# Patient Record
Sex: MALE | Race: WHITE | NOT HISPANIC OR LATINO | Employment: FULL TIME | URBAN - NONMETROPOLITAN AREA
[De-identification: names, ages, dates, MRNs, and addresses within clinical notes are randomized per-mention and may not be internally consistent; named-entity substitution may affect disease eponyms.]

---

## 2025-08-07 ENCOUNTER — OFFICE VISIT (OUTPATIENT)
Dept: URGENT CARE | Facility: CLINIC | Age: 60
End: 2025-08-07
Payer: COMMERCIAL

## 2025-08-07 VITALS
WEIGHT: 225 LBS | SYSTOLIC BLOOD PRESSURE: 150 MMHG | RESPIRATION RATE: 16 BRPM | DIASTOLIC BLOOD PRESSURE: 81 MMHG | HEIGHT: 68 IN | BODY MASS INDEX: 34.1 KG/M2 | TEMPERATURE: 97.8 F | OXYGEN SATURATION: 98 % | HEART RATE: 57 BPM

## 2025-08-07 DIAGNOSIS — R05.1 ACUTE COUGH: ICD-10-CM

## 2025-08-07 DIAGNOSIS — J01.90 ACUTE SINUSITIS, RECURRENCE NOT SPECIFIED, UNSPECIFIED LOCATION: Primary | ICD-10-CM

## 2025-08-07 PROCEDURE — 99213 OFFICE O/P EST LOW 20 MIN: CPT | Performed by: NURSE PRACTITIONER

## 2025-08-07 RX ORDER — AMOXICILLIN AND CLAVULANATE POTASSIUM 875; 125 MG/1; MG/1
1 TABLET, FILM COATED ORAL 2 TIMES DAILY
Qty: 20 TABLET | Refills: 0 | Status: SHIPPED | OUTPATIENT
Start: 2025-08-07 | End: 2025-08-17

## 2025-08-07 RX ORDER — BENZONATATE 100 MG/1
100-200 CAPSULE ORAL EVERY 8 HOURS PRN
Qty: 60 CAPSULE | Refills: 0 | Status: SHIPPED | OUTPATIENT
Start: 2025-08-07

## 2025-08-07 NOTE — PROGRESS NOTES
[facility] U[facility]  Eileen ERICKA Mares, APRN-CNP     Visit Note - 8/7/2025 5:07 PM   This note was generated with voice recognition software and may contain errors including spelling, grammar, syntax, and misrecognization of what was dictated.    Patient: Yovani Vasquez, MRN: [id number], 59 y.o., male   PCP: Reilly Wasserman MD  ------------------------------------  ALLERGIES: Allergies[1]     CURRENT MEDICATIONS:   Current Outpatient Medications   Medication Instructions    amoxicillin-clavulanate (Augmentin) 875-125 mg tablet 1 tablet, oral, 2 times daily, Take with a meal.    benzonatate (TESSALON) 100-200 mg, oral, Every 8 hours PRN, Do not crush or chew.     ------------------------------------  PAST MEDICAL HX:  Problem List[2]   SURGICAL HX:  Surgical History[3]   FAMILY HX:   No pertinent history.   SOCIAL HX:    reports that he has been smoking cigarettes. He does not have any smokeless tobacco history on file. . Employed.   ------------------------------------  CHIEF COMPLAINT:   Chief Complaint   Patient presents with    URI     SINUS DRAINAGE, COUGH, FACE PRESSURE  X 10 DAYS       HISTORY OF PRESENT ILLNESS: The history was obtained from patient and his wife. Yovani is a 59 y.o. male, who presents with a chief complaint of sinus pain/nasal congestion (clear mucus) that has been radiating into his teeth, a persistent, dry cough, scratchy throat, and sinus headaches - sxs started 1.5 weeks ago. Denies any fever/chills, body aches, ear pain, abdominal pain, chest pain, wheezing/shortness of breath, rashes, urinary symptoms, nausea/vomiting, and diarrhea. Denies any lightheadedness or dizziness; no changes in mental status. No swelling in legs. Appetite is normal; is able to eat and drink fluids without difficulty; denies loss of sense of taste or smell. Reports symptoms have gotten worse since onset. Has been taking Tylenol Sinus, AlkaSeltzer, Nyquil, Robitussin Cough, and Claritin without  "much relief; no other over-the-counter medications or home remedies for symptom management. No known ill contacts. Has not received the COVID vaccine. Has not received this season's influenza vaccine.. No known history of COVID infection.  Is a current smoker.  No known history of asthma/COPD/respiratory issues. No recent antibiotic use.     REVIEW OF SYSTEMS:  10 systems reviewed negative with exception of history of present illness as listed above.    TODAY'S VITALS: /81   Pulse 57   Temp 36.6 °C (97.8 °F) (Temporal)   Resp 16   Ht 1.727 m (5' 8\")   Wt 102 kg (225 lb)   SpO2 98%   BMI 34.21 kg/m²     PHYSICAL EXAMINATION:  General: Mildly ill-appearing, well nourished male; alert and oriented, in no acute distress. + audible nasal congestion. Accompanied by his wife.   Eyes:  Pupils equal, round and reactive to light. No conjunctival erythema; no scleral icterus.   HENT: + maxillary sinus tenderness bilat, with audible nasal congestion. Bilat ear canals clear/unremarkable, but TMs with clear effusions bilat; no erythema, and not bulging. Nasal mucosa moderately boggy and edematous. Airway patent, oral mucosa moist. Posterior pharynx mildly injected but without lesions or oropharyngeal exudate aside from PND. Uvula is midline. Trachea is midline. Managing oral secretions without difficulty.  Neck:  Supple. Tender, mobile anterior cervical lymphadenopathy bilat.  Respiratory:  Lungs are clear to auscultation; no wheezes, rhonchi, or rales. Respirations unlabored, Breath sounds are equal, Symmetrical chest wall expansion. Persistent, non-productive cough noted.   Cardiovascular:  Normal rate, Regular rhythm. Normal S1S2. No m/r/g. No peripheral edema.   Gastrointestinal:  Soft, non-tender, non-distended; no palpable masses or organomegaly. Bowel sounds normoactive.  Musculoskeletal:  Grossly normal  Integumentary:  Pink, warm, dry, and intact. No rashes or skin discoloration appreciated.    Neurologic:  " Alert and oriented, no focal deficits, no motor or sensory deficits.    Cognition and Speech:  Oriented, Speech clear and coherent.    Psychiatric:  Cooperative, Appropriate mood & affect.       ------------------------------------  Medical Decision Making  LABORATORY or RADIOLOGICAL IMAGING ORDERS/RESULTS:   None    IMPRESSION/PLAN:  Course: Worsening; stable    1. Acute sinusitis, recurrence not specified, unspecified location (Primary)  2. Acute cough  - amoxicillin-clavulanate (Augmentin) 875-125 mg tablet; Take 1 tablet by mouth 2 times a day for 10 days. Take with a meal.  Dispense: 20 tablet; Refill: 0  - benzonatate (Tessalon) 100 mg capsule; Take 1-2 capsules (100-200 mg) by mouth every 8 hours if needed for cough. Do not crush or chew.  Dispense: 60 capsule; Refill: 0    No red flags on exam today. Symptoms consistent with sinusitis, although reviewed other potential etiologies. Due to severity/duration of sxs, will begin treatment for bacterial infection today (Augmentin), and will also start benzonatate for PRN use. Should finish full course of antibiotics, even if symptoms resolve more quickly. Instructed to push fluids, rest, and to use appropriate over the counter medications as needed for management of symptoms - discussed that Mucinex, vaporizer, and Saline Nasal Spray may be helpful.  Reviewed red flags to monitor for, counseled on potential adverse reactions of treatments, expectations for improvement in sxs, and advised to follow-up with primary care provider in 3-5 days if symptoms persist, or sooner if worsening or if any additional concerns/red flags develop.  Patient verbalized understanding and agreed with plan of care; questions were encouraged and answered.       EZ Orozco-CNP   Advanced Practice Provider  [facility]         [1] No Known Allergies  [2] There is no problem list on file for this patient.  [3]   Past Surgical History:  Procedure Laterality  Date    CT ANGIO NECK  1/23/2020    CT NECK ANGIO W AND WO IV CONTRAST 1/23/2020 [facility]    OTHER SURGICAL HISTORY  09/23/2020    No history of surgery